# Patient Record
Sex: FEMALE | Race: WHITE | NOT HISPANIC OR LATINO | URBAN - NONMETROPOLITAN AREA
[De-identification: names, ages, dates, MRNs, and addresses within clinical notes are randomized per-mention and may not be internally consistent; named-entity substitution may affect disease eponyms.]

---

## 2023-07-12 ENCOUNTER — OFFICE (OUTPATIENT)
Dept: URBAN - NONMETROPOLITAN AREA CLINIC 1 | Facility: CLINIC | Age: 58
End: 2023-07-12

## 2023-07-12 VITALS
HEART RATE: 72 BPM | HEIGHT: 68 IN | SYSTOLIC BLOOD PRESSURE: 116 MMHG | WEIGHT: 167 LBS | DIASTOLIC BLOOD PRESSURE: 76 MMHG

## 2023-07-12 DIAGNOSIS — E78.5 HYPERLIPIDEMIA, UNSPECIFIED: ICD-10-CM

## 2023-07-12 DIAGNOSIS — Z86.010 PERSONAL HISTORY OF COLONIC POLYPS: ICD-10-CM

## 2023-07-12 DIAGNOSIS — R11.2 NAUSEA WITH VOMITING, UNSPECIFIED: ICD-10-CM

## 2023-07-12 DIAGNOSIS — I10 ESSENTIAL (PRIMARY) HYPERTENSION: ICD-10-CM

## 2023-07-12 DIAGNOSIS — F41.9 ANXIETY DISORDER, UNSPECIFIED: ICD-10-CM

## 2023-07-12 DIAGNOSIS — R10.11 RIGHT UPPER QUADRANT PAIN: ICD-10-CM

## 2023-07-12 DIAGNOSIS — Z83.71 FAMILY HISTORY OF COLONIC POLYPS: ICD-10-CM

## 2023-07-12 PROCEDURE — 99204 OFFICE O/P NEW MOD 45 MIN: CPT | Performed by: NURSE PRACTITIONER

## 2023-07-12 NOTE — SERVICEHPINOTES
She has episodes of abdominal pain at her right upper quadrant that are intermittent but can be severe.  Sometimes it hurts so bad that she screams, but hen it eventually goes away. She also has chronic episodes of vomiting, mostly mucus and undigested food.  She uses Zofran and Gas-X but does not get much relief.  Her last EGD and colonoscopy were done in [place] around 2020.  She has had colon polyps, and her dad and brother have also had polyps.   her chronic illnesses include anxiety, degenerative disc disease, hypertension, osteoarthritis, seizure disorder.  She had an MI several years ago.  ,

## 2023-07-26 LAB
CT ABDOMEN PELVIS WITH CONTRAST: TEXT: (no result)
NM GASTRIC EMPTYING SCAN: TEXT: (no result)

## 2023-08-09 ENCOUNTER — ON CAMPUS - OUTPATIENT (OUTPATIENT)
Dept: URBAN - NONMETROPOLITAN AREA HOSPITAL 34 | Facility: HOSPITAL | Age: 58
End: 2023-08-09
Payer: MEDICAID

## 2023-08-09 DIAGNOSIS — K20.80 OTHER ESOPHAGITIS WITHOUT BLEEDING: ICD-10-CM

## 2023-08-09 PROCEDURE — 43235 EGD DIAGNOSTIC BRUSH WASH: CPT | Performed by: INTERNAL MEDICINE

## 2024-07-25 ENCOUNTER — OFFICE (OUTPATIENT)
Dept: URBAN - NONMETROPOLITAN AREA CLINIC 1 | Facility: CLINIC | Age: 59
End: 2024-07-25
Payer: MEDICAID

## 2024-07-25 VITALS
DIASTOLIC BLOOD PRESSURE: 82 MMHG | HEART RATE: 71 BPM | WEIGHT: 163 LBS | HEIGHT: 68 IN | SYSTOLIC BLOOD PRESSURE: 129 MMHG

## 2024-07-25 DIAGNOSIS — R10.13 EPIGASTRIC PAIN: ICD-10-CM

## 2024-07-25 DIAGNOSIS — Z83.719 FAMILY HISTORY OF COLON POLYPS, UNSPECIFIED: ICD-10-CM

## 2024-07-25 DIAGNOSIS — E78.5 HYPERLIPIDEMIA, UNSPECIFIED: ICD-10-CM

## 2024-07-25 DIAGNOSIS — Z86.010 PERSONAL HISTORY OF COLONIC POLYPS: ICD-10-CM

## 2024-07-25 DIAGNOSIS — K22.10 ULCER OF ESOPHAGUS WITHOUT BLEEDING: ICD-10-CM

## 2024-07-25 DIAGNOSIS — R11.0 NAUSEA: ICD-10-CM

## 2024-07-25 DIAGNOSIS — K21.9 GASTRO-ESOPHAGEAL REFLUX DISEASE WITHOUT ESOPHAGITIS: ICD-10-CM

## 2024-07-25 DIAGNOSIS — I10 ESSENTIAL (PRIMARY) HYPERTENSION: ICD-10-CM

## 2024-07-25 DIAGNOSIS — F41.9 ANXIETY DISORDER, UNSPECIFIED: ICD-10-CM

## 2024-07-25 PROCEDURE — 99214 OFFICE O/P EST MOD 30 MIN: CPT | Performed by: NURSE PRACTITIONER

## 2024-07-25 RX ORDER — OMEPRAZOLE 40 MG/1
CAPSULE, DELAYED RELEASE ORAL
Qty: 60 | Refills: 11 | Status: ACTIVE
Start: 2024-07-25

## 2024-07-25 NOTE — SERVICENOTES
The risks for EGD and colonoscopy were discussed with her and she agrees to proceed.  
The bowel prep was prescribed and instructions were provided.

## 2024-07-25 NOTE — SERVICEHPINOTES
In today with complaints of GERD, epigastric pain and nausea.   She is requesting EGD and colonoscopy.  I saw here about a year ago with the same symptoms.  Her EGD showed erosive esophagitis and she was prescribed Protonix 40 mg daily.  She says she quit taking it because it did not help.  She is not taking anything for acid suppression. Her last colonoscopy was done around 2020 in [place].  She has a history of colon polyps, and a family history of colon polyps.   I have explained that erosive esophagitis will heal if she will be compliant with acid suppression.  I suggested we try a different PPI and she is agreeable. Her chronic illnesses include anxiety, degenerative disc disease, hypertension, osteoarthritis, seizure disorder. She had an MI several years ago  br
-CT abd/pel wc  7/26/23-brIMPRESSION:1. No findings to explain patient's provided symptoms.2. Other chronic/incidental findings as above
br  br  -Gastric emptying scan 7/26/23-
br IMPRESSION:brRapid gastric emptyingbr
br -EGD 8/9/23 by Dr. Cruz-
brImpression and Recommendations: brGrade II erosive esophagitis.brPatient will be discharged home to continue present medications and diet.br Behavior modification for gastroesophageal reflux diseasebr Protonix 40 milligrams p.o. q.a.m. 30 minutes before breakfast
br
br.    
br

## 2025-01-23 ENCOUNTER — ON CAMPUS - OUTPATIENT (OUTPATIENT)
Dept: URBAN - NONMETROPOLITAN AREA HOSPITAL 34 | Facility: HOSPITAL | Age: 60
End: 2025-01-23
Payer: MEDICARE

## 2025-01-23 DIAGNOSIS — Z09 ENCOUNTER FOR FOLLOW-UP EXAMINATION AFTER COMPLETED TREATMEN: ICD-10-CM

## 2025-01-23 DIAGNOSIS — K31.89 OTHER DISEASES OF STOMACH AND DUODENUM: ICD-10-CM

## 2025-01-23 DIAGNOSIS — Z86.0101 PERSONAL HISTORY OF ADENOMATOUS AND SERRATED COLON POLYPS: ICD-10-CM

## 2025-01-23 DIAGNOSIS — K57.30 DIVERTICULOSIS OF LARGE INTESTINE WITHOUT PERFORATION OR ABS: ICD-10-CM

## 2025-01-23 DIAGNOSIS — D12.3 BENIGN NEOPLASM OF TRANSVERSE COLON: ICD-10-CM

## 2025-01-23 PROCEDURE — 43239 EGD BIOPSY SINGLE/MULTIPLE: CPT | Performed by: INTERNAL MEDICINE

## 2025-01-23 PROCEDURE — 45385 COLONOSCOPY W/LESION REMOVAL: CPT | Performed by: INTERNAL MEDICINE
